# Patient Record
Sex: MALE | Employment: FULL TIME | URBAN - METROPOLITAN AREA
[De-identification: names, ages, dates, MRNs, and addresses within clinical notes are randomized per-mention and may not be internally consistent; named-entity substitution may affect disease eponyms.]

---

## 2022-07-27 ENCOUNTER — NURSE ONLY (OUTPATIENT)
Dept: INTERNAL MEDICINE CLINIC | Facility: CLINIC | Age: 27
End: 2022-07-27
Payer: COMMERCIAL

## 2022-07-27 DIAGNOSIS — Z00.00 LABORATORY EXAMINATION ORDERED AS PART OF A ROUTINE GENERAL MEDICAL EXAMINATION: Primary | ICD-10-CM

## 2022-07-27 LAB
AMB EXT CHLORIDE: 106
AMB EXT CHOL/HDL RATIO: 3.1
AMB EXT CHOLESTEROL, TOTAL: 152 MG/DL
AMB EXT CMP ALT: 26 U/L
AMB EXT CMP AST: 23 U/L
AMB EXT GLUCOSE: 88 MG/DL
AMB EXT HDL CHOLESTEROL: 49 MG/DL
AMB EXT HEMATOCRIT: 42.5
AMB EXT HEMOGLOBIN: 14.3
AMB EXT HGBA1C: 5 %
AMB EXT LDL CHOLESTEROL, DIRECT: 88 MG/DL
AMB EXT MCV: 87.4
AMB EXT NON HDL CHOL: 103 MG/DL
AMB EXT POSTASSIUM: 4.3 MMOL/L
AMB EXT SODIUM: 139 MMOL/L
AMB EXT TRIGLYCERIDES: 65 MG/DL
AMB EXT TSH: 2.96 MIU/ML
AMB EXT WBC: 4.9 X10(3)UL
BILIRUB UR QL STRIP.AUTO: NEGATIVE
CLARITY UR REFRACT.AUTO: CLEAR
COLOR UR AUTO: YELLOW
GLUCOSE UR STRIP.AUTO-MCNC: NEGATIVE MG/DL
KETONES UR STRIP.AUTO-MCNC: NEGATIVE MG/DL
NITRITE UR QL STRIP.AUTO: NEGATIVE
PH UR STRIP.AUTO: 6 [PH] (ref 5–8)
PROT UR STRIP.AUTO-MCNC: NEGATIVE MG/DL
RBC UR QL AUTO: NEGATIVE
SP GR UR STRIP.AUTO: >=1.03 (ref 1–1.03)
UROBILINOGEN UR STRIP.AUTO-MCNC: 0.2 MG/DL

## 2022-07-27 PROCEDURE — 81001 URINALYSIS AUTO W/SCOPE: CPT | Performed by: INTERNAL MEDICINE

## 2022-07-27 PROCEDURE — 99173 VISUAL ACUITY SCREEN: CPT | Performed by: INTERNAL MEDICINE

## 2022-07-27 PROCEDURE — 92551 PURE TONE HEARING TEST AIR: CPT | Performed by: INTERNAL MEDICINE

## 2022-07-27 PROCEDURE — 93923 UPR/LXTR ART STDY 3+ LVLS: CPT | Performed by: INTERNAL MEDICINE
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
## 2022-08-09 ENCOUNTER — OFFICE VISIT (OUTPATIENT)
Dept: INTERNAL MEDICINE CLINIC | Facility: CLINIC | Age: 27
End: 2022-08-09
Payer: COMMERCIAL

## 2022-08-09 VITALS
SYSTOLIC BLOOD PRESSURE: 126 MMHG | DIASTOLIC BLOOD PRESSURE: 62 MMHG | WEIGHT: 204.63 LBS | HEIGHT: 71.5 IN | RESPIRATION RATE: 16 BRPM | OXYGEN SATURATION: 97 % | TEMPERATURE: 100 F | BODY MASS INDEX: 28.02 KG/M2 | HEART RATE: 68 BPM

## 2022-08-09 DIAGNOSIS — Q23.1 BICUSPID AORTIC VALVE: ICD-10-CM

## 2022-08-09 DIAGNOSIS — E55.9 VITAMIN D INSUFFICIENCY: ICD-10-CM

## 2022-08-09 DIAGNOSIS — Z00.00 ROUTINE GENERAL MEDICAL EXAMINATION AT A HEALTH CARE FACILITY: Primary | ICD-10-CM

## 2022-08-09 PROCEDURE — 3074F SYST BP LT 130 MM HG: CPT | Performed by: INTERNAL MEDICINE

## 2022-08-09 PROCEDURE — 3078F DIAST BP <80 MM HG: CPT | Performed by: INTERNAL MEDICINE

## 2022-08-09 PROCEDURE — 3008F BODY MASS INDEX DOCD: CPT | Performed by: INTERNAL MEDICINE

## 2022-08-09 PROCEDURE — 93000 ELECTROCARDIOGRAM COMPLETE: CPT | Performed by: INTERNAL MEDICINE

## 2022-08-09 PROCEDURE — 99395 PREV VISIT EST AGE 18-39: CPT | Performed by: INTERNAL MEDICINE

## 2022-08-09 RX ORDER — SILDENAFIL CITRATE 20 MG/1
20 TABLET ORAL 3 TIMES DAILY
COMMUNITY

## 2022-08-10 PROBLEM — E55.9 VITAMIN D INSUFFICIENCY: Status: ACTIVE | Noted: 2022-08-10

## 2022-08-10 PROBLEM — Q23.1 BICUSPID AORTIC VALVE: Status: ACTIVE | Noted: 2022-08-10

## 2022-10-07 ENCOUNTER — HOSPITAL ENCOUNTER (OUTPATIENT)
Age: 27
Discharge: HOME | End: 2022-10-07
Payer: COMMERCIAL

## 2022-10-07 VITALS
WEIGHT: 195 LBS | DIASTOLIC BLOOD PRESSURE: 73 MMHG | HEIGHT: 72 IN | TEMPERATURE: 97 F | HEART RATE: 57 BPM | BODY MASS INDEX: 26.41 KG/M2 | RESPIRATION RATE: 17 BRPM | OXYGEN SATURATION: 99 % | SYSTOLIC BLOOD PRESSURE: 124 MMHG

## 2022-10-07 DIAGNOSIS — B34.9 VIRAL SYNDROME: Primary | ICD-10-CM

## 2022-10-07 LAB — SARS-COV-2 RNA RESP QL NAA+PROBE: NOT DETECTED

## 2022-10-07 RX ORDER — PREDNISONE 20 MG/1
20 TABLET ORAL 2 TIMES DAILY
Qty: 10 TABLET | Refills: 0 | Status: SHIPPED | OUTPATIENT
Start: 2022-10-07 | End: 2022-10-12

## 2022-10-10 ENCOUNTER — TELEPHONE (OUTPATIENT)
Dept: INTERNAL MEDICINE CLINIC | Facility: CLINIC | Age: 27
End: 2022-10-10

## 2022-10-10 NOTE — TELEPHONE ENCOUNTER
S/w pt   He is feeling better today. Taking Prednisone 20mg BID for 5 days per UC    Cough is better, was productive of yellow sputum  Afebrile   Covid negative    Congested, taking Mucinex and Advil cold/sinus   OTC    To Dr Blayne Rodrigez other suggestions or continue with current meds?   Myles Turcios RN

## 2022-10-10 NOTE — TELEPHONE ENCOUNTER
Please call patient. I received a message from him thru [employer] patient portal on Friday, 10/7. I'm just seeing this now. He went to the urgent care on Friday night with URI symptoms. Please call him for a status update. Also, please encourage him to use [employer] (make sure he signs up) as that is the preferred method for him to send me and our team secure E-messages. Rd Beckham. Jez Joseph MD  Diplomate, American Board of Internal Medicine  Member, American College of Lifestyle Medicine  Member, American Association for [address] [place] [address], [address] [place] [address],[address], [facility], [address] [place] [address] Rd  [phone] (phone); [phone] (fax)  Domo Mata. [email or web address]

## 2022-10-10 NOTE — TELEPHONE ENCOUNTER
VM left for pt to call back with a status update and to get info to sign up for mychart.    Lan Quigley RN

## 2022-10-10 NOTE — TELEPHONE ENCOUNTER
Nothing else to add at this time. Rajiv Shelton. Ministerio Bunn MD  Diplomate, American Board of Internal Medicine  Member, American College of Lifestyle Medicine  Member, American Association for [address], [address] [place] [address],[address], [place], [address]  [phone] (phone); [phone] (fax)  [email or web address]

## 2022-10-26 ENCOUNTER — TELEPHONE (OUTPATIENT)
Dept: INTERNAL MEDICINE CLINIC | Facility: CLINIC | Age: 27
End: 2022-10-26

## 2022-10-27 NOTE — TELEPHONE ENCOUNTER
Pt returned call    Asking about Myocarditis. A  friend of his was diagnosed and he had no symptoms. Pt is scheduled to have a Echo next week. Pt asking if he could be worked up for myocarditis     Denies SOB, chest pain, fatigue, swelling. Had normal EKG here in August.     Told we would await Echo results.    To Dr Ruchi Conner for further instruction    Myles Turcios RN

## 2022-10-27 NOTE — TELEPHONE ENCOUNTER
S/w pt   Per Dr Fantasma Saravia should get Echo   Await results  Myocarditis is dx'd by biopsy  Pt is asymptomatic  Ekg normal from last visit.      Will discuss further after Echo results   Pt agreeable  Tika ALEJO

## 2022-11-08 ENCOUNTER — HOSPITAL ENCOUNTER (OUTPATIENT)
Dept: CV DIAGNOSTICS | Facility: HOSPITAL | Age: 27
Discharge: HOME | End: 2022-11-08
Attending: INTERNAL MEDICINE
Payer: COMMERCIAL

## 2022-11-08 DIAGNOSIS — Q23.1 BICUSPID AORTIC VALVE: ICD-10-CM

## 2022-11-08 PROCEDURE — 93306 TTE W/DOPPLER COMPLETE: CPT | Performed by: INTERNAL MEDICINE

## 2023-01-09 ENCOUNTER — PATIENT MESSAGE (OUTPATIENT)
Dept: INTERNAL MEDICINE CLINIC | Facility: CLINIC | Age: 28
End: 2023-01-09

## 2023-01-09 DIAGNOSIS — Z11.3 SCREEN FOR STD (SEXUALLY TRANSMITTED DISEASE): Primary | ICD-10-CM

## 2023-01-09 NOTE — TELEPHONE ENCOUNTER
1. STI tests ordered. He can do this at his convenience. 2. Dentist = Dr. Kemar Rhodes DDS in [place] from the group called [facility]. He is my personal dentist.  3. Dermatologist = Dr. Ruth Bruce from [facility]. He is my personal dermatologist.       Payam Goddard. Kirby Stewart MD  Diplomate, American Board of Internal Medicine  Member, American College of Lifestyle Medicine  Member, American Association for Physician Leadership  [place] [address], [address] [place] [address],[address], [place], [address]  [phone] (phone); [phone] (fax)  [email or web address]

## 2023-01-09 NOTE — TELEPHONE ENCOUNTER
From: Faizan Grimm  To: Lester Rivera MD  Sent: 1/9/2023 12:11 PM CST  Subject: Wanting a few referrals. Been here almost a year now. I would your recommendation on a good dentist and a good dermatologist in the area. Also I would like to take a basic STI test at some point. Thanks, Carlos Taylor.     Arcadio Subramanian

## 2023-01-16 ENCOUNTER — PATIENT MESSAGE (OUTPATIENT)
Dept: INTERNAL MEDICINE CLINIC | Facility: CLINIC | Age: 28
End: 2023-01-16

## 2023-01-16 ENCOUNTER — LABORATORY ENCOUNTER (OUTPATIENT)
Dept: LAB | Facility: HOSPITAL | Age: 28
End: 2023-01-16
Attending: INTERNAL MEDICINE
Payer: COMMERCIAL

## 2023-01-16 DIAGNOSIS — Z86.19 HISTORY OF CHLAMYDIA INFECTION: Primary | ICD-10-CM

## 2023-01-16 DIAGNOSIS — Z11.3 SCREEN FOR STD (SEXUALLY TRANSMITTED DISEASE): ICD-10-CM

## 2023-01-16 LAB
HBV SURFACE AB SER QL: REACTIVE
HBV SURFACE AB SERPL IA-ACNC: 97.86 MIU/ML
HBV SURFACE AG SER-ACNC: <0.1 [IU]/L
HBV SURFACE AG SERPL QL IA: NONREACTIVE
HCV AB SERPL QL IA: NONREACTIVE
T PALLIDUM AB SER QL IA: NONREACTIVE

## 2023-01-16 PROCEDURE — 87340 HEPATITIS B SURFACE AG IA: CPT

## 2023-01-16 PROCEDURE — 86803 HEPATITIS C AB TEST: CPT

## 2023-01-16 PROCEDURE — 87591 N.GONORRHOEAE DNA AMP PROB: CPT

## 2023-01-16 PROCEDURE — 86780 TREPONEMA PALLIDUM: CPT

## 2023-01-16 PROCEDURE — 86706 HEP B SURFACE ANTIBODY: CPT

## 2023-01-16 PROCEDURE — 87491 CHLMYD TRACH DNA AMP PROBE: CPT

## 2023-01-16 PROCEDURE — 36415 COLL VENOUS BLD VENIPUNCTURE: CPT

## 2023-01-16 PROCEDURE — 87389 HIV-1 AG W/HIV-1&-2 AB AG IA: CPT

## 2023-01-17 ENCOUNTER — TELEPHONE (OUTPATIENT)
Dept: INTERNAL MEDICINE CLINIC | Facility: CLINIC | Age: 28
End: 2023-01-17

## 2023-01-17 DIAGNOSIS — A74.9 CHLAMYDIA INFECTION: Primary | ICD-10-CM

## 2023-01-17 LAB
C TRACH DNA SPEC QL NAA+PROBE: POSITIVE
N GONORRHOEA DNA SPEC QL NAA+PROBE: NEGATIVE

## 2023-01-17 RX ORDER — AZITHROMYCIN 250 MG/1
TABLET, FILM COATED ORAL
Qty: 8 TABLET | Refills: 0 | Status: SHIPPED | OUTPATIENT
Start: 2023-01-17 | End: 2023-01-20

## 2023-01-17 NOTE — TELEPHONE ENCOUNTER
I spoke w/ Marialuisa Worrell. He tested positive for Chlamydia. I explained what this commonly bacterial STI means. He must be treated. His sexual partner should be treated and screened for other STIs as well. We will treat him with Azithromycin 1000 mg on day #1 and then 500 mg on days #2 and #3. Script sent to pharmacy. I did offer him the option to re-test to document resolution in the future in he desires. Abbie Ackerman. Emelia Gallardo MD  Diplomate, American Board of Internal Medicine  Member, American College of Lifestyle Medicine  Member, American Association for Physician Leadership  [address], [address] [place] [address],[address], [facility], [address] [place] [address] Rd  [phone] (phone); [phone] (fax)  Chriss Ladd. [email or web address]

## 2023-01-24 NOTE — TELEPHONE ENCOUNTER
From: Mario Dukes  Sent: 1/24/2023 12:00 PM CST  To: [facility] Clinical Staff  Subject: Wanting a few referrals. Hi Dr. Dayna Glasgow,    Can I do a another urine test this week to make sure my chlamydia is gone.

## 2023-01-24 NOTE — TELEPHONE ENCOUNTER
Updated Chlamydia test ordered per patient's request.       Simran Mohamud. Clementine Harrison MD  Diplomate, American Board of Internal Medicine  Member, American College of Lifestyle Medicine  Member, American Association for Physician Leadership  [address], [address] [place] [address],[address], [place], [address]  [phone] (phone); [phone] (fax)  [email or web address]

## 2023-01-26 ENCOUNTER — LAB ENCOUNTER (OUTPATIENT)
Dept: LAB | Facility: HOSPITAL | Age: 28
End: 2023-01-26
Attending: INTERNAL MEDICINE
Payer: COMMERCIAL

## 2023-01-26 DIAGNOSIS — Z86.19 HISTORY OF CHLAMYDIA INFECTION: ICD-10-CM

## 2023-01-26 PROCEDURE — 87591 N.GONORRHOEAE DNA AMP PROB: CPT

## 2023-01-26 PROCEDURE — 87491 CHLMYD TRACH DNA AMP PROBE: CPT

## 2023-01-27 LAB
C TRACH DNA SPEC QL NAA+PROBE: NEGATIVE
N GONORRHOEA DNA SPEC QL NAA+PROBE: NEGATIVE

## 2023-02-08 ENCOUNTER — HOSPITAL ENCOUNTER (EMERGENCY)
Facility: HOSPITAL | Age: 28
Discharge: HOME | End: 2023-02-08
Attending: STUDENT IN AN ORGANIZED HEALTH CARE EDUCATION/TRAINING PROGRAM
Payer: COMMERCIAL

## 2023-02-08 ENCOUNTER — PATIENT MESSAGE (OUTPATIENT)
Dept: INTERNAL MEDICINE CLINIC | Facility: CLINIC | Age: 28
End: 2023-02-08

## 2023-02-08 VITALS
TEMPERATURE: 98 F | OXYGEN SATURATION: 99 % | SYSTOLIC BLOOD PRESSURE: 156 MMHG | HEART RATE: 73 BPM | WEIGHT: 190 LBS | RESPIRATION RATE: 18 BRPM | HEIGHT: 72 IN | BODY MASS INDEX: 25.73 KG/M2 | DIASTOLIC BLOOD PRESSURE: 77 MMHG

## 2023-02-08 DIAGNOSIS — R19.7 BLOODY DIARRHEA: Primary | ICD-10-CM

## 2023-02-08 LAB
ALBUMIN SERPL-MCNC: 4.5 G/DL (ref 3.4–5)
ALBUMIN/GLOB SERPL: 1.3 {RATIO} (ref 1–2)
ALP LIVER SERPL-CCNC: 58 U/L
ALT SERPL-CCNC: 28 U/L
ANION GAP SERPL CALC-SCNC: 6 MMOL/L (ref 0–18)
AST SERPL-CCNC: 28 U/L (ref 15–37)
BASOPHILS # BLD AUTO: 0.06 X10(3) UL (ref 0–0.2)
BASOPHILS NFR BLD AUTO: 0.7 %
BILIRUB SERPL-MCNC: 0.5 MG/DL (ref 0.1–2)
BUN BLD-MCNC: 10 MG/DL (ref 7–18)
CALCIUM BLD-MCNC: 9.5 MG/DL (ref 8.5–10.1)
CHLORIDE SERPL-SCNC: 108 MMOL/L (ref 98–112)
CO2 SERPL-SCNC: 25 MMOL/L (ref 21–32)
CREAT BLD-MCNC: 1.08 MG/DL
EOSINOPHIL # BLD AUTO: 0.35 X10(3) UL (ref 0–0.7)
EOSINOPHIL NFR BLD AUTO: 4 %
ERYTHROCYTE [DISTWIDTH] IN BLOOD BY AUTOMATED COUNT: 12.5 %
GFR SERPLBLD BASED ON 1.73 SQ M-ARVRAT: 98 ML/MIN/1.73M2 (ref 60–?)
GLOBULIN PLAS-MCNC: 3.5 G/DL (ref 2.8–4.4)
GLUCOSE BLD-MCNC: 101 MG/DL (ref 70–99)
HCT VFR BLD AUTO: 42.9 %
HGB BLD-MCNC: 14.9 G/DL
IMM GRANULOCYTES # BLD AUTO: 0.02 X10(3) UL (ref 0–1)
IMM GRANULOCYTES NFR BLD: 0.2 %
LYMPHOCYTES # BLD AUTO: 3.25 X10(3) UL (ref 1–4)
LYMPHOCYTES NFR BLD AUTO: 37.6 %
MCH RBC QN AUTO: 29.6 PG (ref 26–34)
MCHC RBC AUTO-ENTMCNC: 34.7 G/DL (ref 31–37)
MCV RBC AUTO: 85.3 FL
MONOCYTES # BLD AUTO: 0.67 X10(3) UL (ref 0.1–1)
MONOCYTES NFR BLD AUTO: 7.7 %
NEUTROPHILS # BLD AUTO: 4.3 X10 (3) UL (ref 1.5–7.7)
NEUTROPHILS # BLD AUTO: 4.3 X10(3) UL (ref 1.5–7.7)
NEUTROPHILS NFR BLD AUTO: 49.8 %
OSMOLALITY SERPL CALC.SUM OF ELEC: 287 MOSM/KG (ref 275–295)
PLATELET # BLD AUTO: 277 10(3)UL (ref 150–450)
POTASSIUM SERPL-SCNC: 3.5 MMOL/L (ref 3.5–5.1)
PROT SERPL-MCNC: 8 G/DL (ref 6.4–8.2)
RBC # BLD AUTO: 5.03 X10(6)UL
SODIUM SERPL-SCNC: 139 MMOL/L (ref 136–145)
WBC # BLD AUTO: 8.7 X10(3) UL (ref 4–11)

## 2023-02-08 PROCEDURE — 85025 COMPLETE CBC W/AUTO DIFF WBC: CPT | Performed by: STUDENT IN AN ORGANIZED HEALTH CARE EDUCATION/TRAINING PROGRAM

## 2023-02-08 PROCEDURE — 99283 EMERGENCY DEPT VISIT LOW MDM: CPT

## 2023-02-08 PROCEDURE — 36415 COLL VENOUS BLD VENIPUNCTURE: CPT

## 2023-02-08 PROCEDURE — 80053 COMPREHEN METABOLIC PANEL: CPT | Performed by: STUDENT IN AN ORGANIZED HEALTH CARE EDUCATION/TRAINING PROGRAM

## 2023-02-08 PROCEDURE — 99284 EMERGENCY DEPT VISIT MOD MDM: CPT

## 2023-02-08 NOTE — ED INITIAL ASSESSMENT (HPI)
A&Ox3 ambulatory patient p/w blood in stool    Patient reports developing lower abdominal pain this afternoon, had a formed painful stool then progressed to diarrhea    Patient then had an episode of bright red/pink tinged blood in toilet    Denies hx of hemorrhoids    Denies any n/v    RR even/NL

## 2023-02-09 ENCOUNTER — PATIENT MESSAGE (OUTPATIENT)
Dept: INTERNAL MEDICINE CLINIC | Facility: CLINIC | Age: 28
End: 2023-02-09

## 2023-02-09 ENCOUNTER — LAB ENCOUNTER (OUTPATIENT)
Dept: LAB | Facility: HOSPITAL | Age: 28
End: 2023-02-09
Attending: STUDENT IN AN ORGANIZED HEALTH CARE EDUCATION/TRAINING PROGRAM
Payer: COMMERCIAL

## 2023-02-09 ENCOUNTER — TELEPHONE (OUTPATIENT)
Dept: INTERNAL MEDICINE CLINIC | Facility: CLINIC | Age: 28
End: 2023-02-09

## 2023-02-09 DIAGNOSIS — R19.7 BLOODY DIARRHEA: ICD-10-CM

## 2023-02-09 PROCEDURE — 87045 FECES CULTURE AEROBIC BACT: CPT

## 2023-02-09 PROCEDURE — 87427 SHIGA-LIKE TOXIN AG IA: CPT

## 2023-02-09 PROCEDURE — 87046 STOOL CULTR AEROBIC BACT EA: CPT

## 2023-02-09 PROCEDURE — 87493 C DIFF AMPLIFIED PROBE: CPT

## 2023-02-09 PROCEDURE — 82272 OCCULT BLD FECES 1-3 TESTS: CPT

## 2023-02-09 NOTE — TELEPHONE ENCOUNTER
S.w pt  Pt was sent to the ER by Dr Jose Christine for bloody diarrhea    Pt will drop stool specimen at outpt lab today. Feels fine. No abd cramping/pain   No fever. Diarrhea resolved. Pt advised to see a Gastroenterologist per ER doctor.      To Dr Karma Abreu RN

## 2023-02-09 NOTE — TELEPHONE ENCOUNTER
Frank Oneill is booking out until April for all MD's. Cannot schedule new pt's with a NP    When I asked about a pt that was seen in the ER, she said she could put him on a wait list.     Carlito Mixon to wait or try another GI group?     To Dr Deja Hernandez RN

## 2023-02-09 NOTE — TELEPHONE ENCOUNTER
Per Dr Jenn Armstrong   Will try Dr SAL [facility] group    OV made with Dr Ortega Ross 2/16   Pt notified.     Gideon Rashid RN

## 2023-02-09 NOTE — TELEPHONE ENCOUNTER
Any doc at [address] [place] [address] will work. They are all great and I've worked closely with all of them. Marixa Ly. Felipe Murphy MD  Diplomate, American Board of Internal Medicine  Member, American College of Lifestyle Medicine  Member, American Association for Physician Leadership  [address], [address] [place] [address],[address], [place], [address]  [phone] (phone); [phone] (fax)  [email or web address]

## 2023-02-10 LAB — C DIFF TOX B STL QL: NEGATIVE

## 2023-02-10 NOTE — TELEPHONE ENCOUNTER
I spoke w/ Radha Ortiz today. He's had essential resolution of his bloody stools and diarrhea. Lab testing thus far is negative. Will touch base next week w/ him. He's set to see Colorectal Surgery toward the end of the week on 2/16. Alina Woodard. Darshan Ferrer MD  Diplomate, American Board of Internal Medicine  Member, American College of Lifestyle Medicine  Member, American Association for Physician Leadership  [address], [address] [place] [address],[address], [place], [address]  [phone] (phone); [phone] (fax)  Jose Alfredo Gomez. [email or web address]

## 2023-02-13 ENCOUNTER — TELEPHONE (OUTPATIENT)
Dept: INTERNAL MEDICINE CLINIC | Facility: CLINIC | Age: 28
End: 2023-02-13

## 2023-02-13 ENCOUNTER — PATIENT MESSAGE (OUTPATIENT)
Dept: INTERNAL MEDICINE CLINIC | Facility: CLINIC | Age: 28
End: 2023-02-13

## 2023-02-16 ENCOUNTER — OFFICE VISIT (OUTPATIENT)
Facility: LOCATION | Age: 28
End: 2023-02-16
Payer: COMMERCIAL

## 2023-02-16 VITALS — TEMPERATURE: 98 F | HEART RATE: 99 BPM

## 2023-02-16 DIAGNOSIS — K62.5 RECTAL BLEEDING: Primary | ICD-10-CM

## 2023-02-16 PROCEDURE — 99203 OFFICE O/P NEW LOW 30 MIN: CPT | Performed by: STUDENT IN AN ORGANIZED HEALTH CARE EDUCATION/TRAINING PROGRAM

## 2023-02-16 RX ORDER — POLYETHYLENE GLYCOL 3350, SODIUM CHLORIDE, SODIUM BICARBONATE, POTASSIUM CHLORIDE 420; 11.2; 5.72; 1.48 G/4L; G/4L; G/4L; G/4L
POWDER, FOR SOLUTION ORAL
Qty: 1 EACH | Refills: 0 | Status: SHIPPED | OUTPATIENT
Start: 2023-02-16

## 2023-03-01 ENCOUNTER — TELEPHONE (OUTPATIENT)
Facility: LOCATION | Age: 28
End: 2023-03-01

## 2023-03-01 NOTE — TELEPHONE ENCOUNTER
Pt walked in and asked about scheduling his colonoscopy, please call the pt to discuss  Best callback number is [phone]

## 2023-03-16 ENCOUNTER — PATIENT MESSAGE (OUTPATIENT)
Dept: INTERNAL MEDICINE CLINIC | Facility: CLINIC | Age: 28
End: 2023-03-16

## 2023-03-16 NOTE — TELEPHONE ENCOUNTER
From: Matthew Hernandez  Sent: 3/16/2023 3:39 PM CDT  To: [facility] Clinical Staff  Subject: Sildenafil    Yes, I have been since high school.

## 2023-03-17 RX ORDER — SILDENAFIL CITRATE 20 MG/1
20 TABLET ORAL NIGHTLY PRN
Qty: 30 TABLET | Refills: 0 | Status: SHIPPED | OUTPATIENT
Start: 2023-03-17

## 2023-03-23 ENCOUNTER — TELEPHONE (OUTPATIENT)
Dept: INTERNAL MEDICINE CLINIC | Facility: CLINIC | Age: 28
End: 2023-03-23

## 2023-03-23 RX ORDER — SILDENAFIL 25 MG/1
25 TABLET, FILM COATED ORAL
Refills: 0 | COMMUNITY
Start: 2023-03-23

## 2023-03-23 NOTE — TELEPHONE ENCOUNTER
Dr. Ministerio Bunn prefers to use [facility]. P [phone]  F [phone]    Account set up for pt   Order # [id number]    $107 for 88 tablets of Sildenafil  Rx faxed to number above. Pt notified by [employer].      Katie Booth RN

## 2023-04-11 ENCOUNTER — SURGERY CENTER DOCUMENTATION (OUTPATIENT)
Dept: SURGERY | Age: 28
End: 2023-04-11

## 2023-04-11 ENCOUNTER — LAB REQUISITION (OUTPATIENT)
Dept: SURGERY | Age: 28
End: 2023-04-11
Payer: COMMERCIAL

## 2023-04-11 DIAGNOSIS — R19.7 DIARRHEA, UNSPECIFIED TYPE: ICD-10-CM

## 2023-04-11 DIAGNOSIS — K62.5 HEMORRHAGE OF RECTUM AND ANUS: ICD-10-CM

## 2023-04-11 DIAGNOSIS — K62.5 RECTAL BLEEDING: ICD-10-CM

## 2023-04-11 PROCEDURE — 88305 TISSUE EXAM BY PATHOLOGIST: CPT | Performed by: ANESTHESIOLOGY

## 2023-04-15 ENCOUNTER — TELEPHONE (OUTPATIENT)
Dept: INTERNAL MEDICINE CLINIC | Facility: CLINIC | Age: 28
End: 2023-04-15

## 2023-04-15 RX ORDER — CIPROFLOXACIN 250 MG/1
250 TABLET, FILM COATED ORAL 2 TIMES DAILY
Qty: 14 TABLET | Refills: 0 | Status: SHIPPED | OUTPATIENT
Start: 2023-04-15 | End: 2023-04-22

## 2023-04-15 NOTE — TELEPHONE ENCOUNTER
I was contact by Radha Ortiz last night that he believes he's having UTI symptoms. He is requesting an antibiotic. Script for Cipro 250 mg BID x 7 days sent to pharmacy. Alina Woodard. Darshan Ferrer MD  Diplomate, American Board of Internal Medicine  Member, American College of Lifestyle Medicine  Member, American Association for Physician Leadership  [address], [address] [place] [address],[address], [facility], [address] [place] [address] Rd  [phone] (phone); [phone] (fax)  Jose Alfredo [email or web address]. [email or web address]

## 2023-05-08 ENCOUNTER — MED REC SCAN ONLY (OUTPATIENT)
Dept: INTERNAL MEDICINE CLINIC | Facility: CLINIC | Age: 28
End: 2023-05-08

## 2023-05-19 ENCOUNTER — TELEPHONE (OUTPATIENT)
Dept: INTERNAL MEDICINE CLINIC | Facility: CLINIC | Age: 28
End: 2023-05-19

## 2023-06-13 ENCOUNTER — TELEPHONE (OUTPATIENT)
Dept: INTERNAL MEDICINE CLINIC | Facility: CLINIC | Age: 28
End: 2023-06-13

## 2023-06-13 NOTE — TELEPHONE ENCOUNTER
Patient called to report that he has moved to [place] and will be leaving the practice. He is hoping to transfer to another [facility] practice. He will call [facility] to adjust his membership. Per patient's request I emailed him a copy of his immunizations.

## 2023-06-14 NOTE — TELEPHONE ENCOUNTER
Noted. I thanked the patient for allowing us to be a part of his medical team and wished him the best of continued health & life blessings. Abbie Ackerman. Emelia Gallardo MD  Diplomate, American Board of Internal Medicine  Member, American College of Lifestyle Medicine  Member, American Association for Physician Leadership  [address], [address] [place] [address],[address], [facility], [address]  [phone] (phone); [phone] (fax)  [email or web address]

## 2023-10-06 ENCOUNTER — PATIENT OUTREACH (OUTPATIENT)
Dept: CASE MANAGEMENT | Age: 28
End: 2023-10-06

## 2023-10-06 NOTE — PROCEDURES
The office order for PCP removal request is Approved and finalized on October 6, 2023.     Thanks,  [facility] [employer]

## 2024-03-26 NOTE — ED INITIAL ASSESSMENT (HPI)
Sinus pressure, congestion, ear pressure & postnasal drip x 72 hours. Felt feverish last noc but no tempeture. No relief w OTC sinus rx. 26-Mar-2024 13:07

## 2024-06-06 NOTE — TELEPHONE ENCOUNTER
Patient has questions in regards to myocarditis.  Phone number  [phone]  [address] [facility] (same as patient)

## (undated) NOTE — LETTER
23    Patient: Floyd Schultz  : 1995 Visit date: 2023    Dear  Gwen Perez MD    Thank you for referring Floyd Schultz to my practice. Please find my assessment and plan below. Assessment   Rectal bleeding  (primary encounter diagnosis)    This is a very nice 29-year-old gentleman who presents to my clinic with his mother today after being seen in the emergency room on 2023 with bloody diarrhea. The bloody diarrhea lasted 2 days then resolved. He is now back to having nonbloody, formed daily stools. He denied any fevers, sick contacts, unusual food intake or recent travel. He was worked up in the emergency room with labs and stool studies which were unremarkable aside from fecal occult blood test resulting positive. He denies any anorectal pain, prolapse, drainage or incontinence. No prior colonoscopy. No prior abdominal or anorectal surgery. No blood thinners. He is currently experiencing mild, diffuse abdominal pain and admits to being anxious about his transient bloody diarrhea. He has been unable to establish care with a gastroenterologist as they were unable to see new patients until April. He looks well on exam today. Plan  I recommend proceeding with a colonoscopy to rule out any masses, polyps, inflammatory bowel disease, microscopic colitis, etc.  The details of this procedure were discussed including the prep instructions, risk, benefits and alternatives. Patient expressed understanding and wished to schedule a colonoscopy. We did discuss ordering a CT scan with IV and rectal contrast to look for any signs of bowel inflammation. We will hold off on this for now unless I am unable to schedule him for colonoscopy in a timely fashion. In the meantime, I advised him that he is free to call the office for advice should he have return of bloody diarrhea.       Sincerely,       Kate Rios MD   CC: No Recipients